# Patient Record
Sex: FEMALE | Race: WHITE | NOT HISPANIC OR LATINO | Employment: PART TIME | URBAN - METROPOLITAN AREA
[De-identification: names, ages, dates, MRNs, and addresses within clinical notes are randomized per-mention and may not be internally consistent; named-entity substitution may affect disease eponyms.]

---

## 2024-04-05 NOTE — PROGRESS NOTES
Assessment/Plan:    Encounter for annual routine gynecological examination  Pap collected today    Contraception discussed - OC to be started.     Encourage healthy diet, exercise, Calcium 1200mg per day and at least 800 iu Vitamin D daily.       Diagnoses and all orders for this visit:    Encounter for annual routine gynecological examination    Cervical cancer screening  -     Liquid-based pap, screening    Encounter for education about contraceptive use  -     levonorgestrel-ethinyl estradiol (AVIANE,ALESSE,LESSINA) 0.1-20 MG-MCG per tablet; Take 1 tablet by mouth daily          Subjective:      Patient ID: Luann Magana is a 23 y.o. female.    Patient presents for a routine annual visit  Menarche- 11Y/O  Last Pap Smear- collect today  LMP- 3/18/24 approx  Birth control-none    Non smoker  Social drinker  Has never been sexually active  Gardasil series-not on file. Per pt believes she did get.   No family history of uterine, ovarian, cervical or breast cancer    Would like to discuss starting birth control for cycle control.  Menses regular, lasting about a week.   Graduated for [employer].  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 Leaving in one month    Gynecologic Exam  She complains of genital itching (intermittent itching for years). She reports no genital lesions, genital odor, genital rash, pelvic pain, vaginal bleeding or vaginal discharge. This is a chronic problem. The current episode started more than 1 year ago. The problem occurs intermittently. The problem has been waxing and waning since onset. The patient is experiencing no pain. Pertinent negatives include no chills, constipation, diarrhea, fever, nausea, sore throat or vomiting. She is not sexually active. The patient's menstrual history has been regular.       The following portions of the patient's history were reviewed and updated as appropriate: She  has a past medical history of Anxiety, Eczema, Migraines, and Psoriasis.  She   Patient  "Active Problem List    Diagnosis Date Noted    Encounter for annual routine gynecological examination 04/08/2024     She  has no past surgical history on file.  Her family history is not on file.  She  reports that she has never smoked. She has never used smokeless tobacco. She reports current alcohol use. She reports that she does not use drugs.  Current Outpatient Medications   Medication Sig Dispense Refill    levonorgestrel-ethinyl estradiol (AVIANE,ALESSE,LESSINA) 0.1-20 MG-MCG per tablet Take 1 tablet by mouth daily 84 tablet 3     No current facility-administered medications for this visit.     No current outpatient medications on file prior to visit.     No current facility-administered medications on file prior to visit.     She has No Known Allergies..    Review of Systems   Constitutional:  Negative for activity change, appetite change, chills, fatigue and fever.   HENT:  Negative for rhinorrhea, sneezing and sore throat.    Eyes:  Negative for visual disturbance.   Respiratory:  Negative for cough, shortness of breath and wheezing.    Cardiovascular:  Negative for chest pain, palpitations and leg swelling.   Gastrointestinal:  Negative for abdominal distention, constipation, diarrhea, nausea and vomiting.   Genitourinary:  Negative for difficulty urinating, pelvic pain and vaginal discharge.   Neurological:  Negative for syncope and light-headedness.         Objective:      /70 (BP Location: Left arm, Patient Position: Sitting, Cuff Size: Standard)   Ht 5' 2.75\" (1.594 m)   Wt 73.6 kg (162 lb 3.2 oz)   LMP 03/18/2024 (Approximate)   BMI 28.96 kg/m²          Physical Exam  Chest:   Breasts:     Breasts are symmetrical.      Right: No inverted nipple, mass, nipple discharge, skin change or tenderness.      Left: No inverted nipple, mass, nipple discharge, skin change or tenderness.   Genitourinary:     Labia:         Right: No rash, tenderness, lesion or injury.         Left: No rash, tenderness, " lesion or injury.       Vagina: Normal. No vaginal discharge, erythema, tenderness or bleeding.      Cervix: No cervical motion tenderness, discharge, friability, erythema or cervical bleeding.      Uterus: Not deviated, not enlarged, not fixed and not tender.       Adnexa:         Right: No mass, tenderness or fullness.          Left: No mass, tenderness or fullness.        Comments: Hymen noted  No evidence of chronic inflmation, no discharge, no lesions  Neurological:      Mental Status: She is alert and oriented to person, place, and time.

## 2024-04-08 ENCOUNTER — OFFICE VISIT (OUTPATIENT)
Dept: OBGYN CLINIC | Facility: CLINIC | Age: 24
End: 2024-04-08
Payer: COMMERCIAL

## 2024-04-08 VITALS
WEIGHT: 162.2 LBS | DIASTOLIC BLOOD PRESSURE: 70 MMHG | SYSTOLIC BLOOD PRESSURE: 134 MMHG | BODY MASS INDEX: 28.74 KG/M2 | HEIGHT: 63 IN

## 2024-04-08 DIAGNOSIS — Z12.4 CERVICAL CANCER SCREENING: ICD-10-CM

## 2024-04-08 DIAGNOSIS — Z01.419 ENCOUNTER FOR ANNUAL ROUTINE GYNECOLOGICAL EXAMINATION: Primary | ICD-10-CM

## 2024-04-08 DIAGNOSIS — Z30.09 ENCOUNTER FOR EDUCATION ABOUT CONTRACEPTIVE USE: ICD-10-CM

## 2024-04-08 PROCEDURE — S0610 ANNUAL GYNECOLOGICAL EXAMINA: HCPCS | Performed by: OBSTETRICS & GYNECOLOGY

## 2024-04-08 PROCEDURE — G0145 SCR C/V CYTO,THINLAYER,RESCR: HCPCS | Performed by: OBSTETRICS & GYNECOLOGY

## 2024-04-08 RX ORDER — LEVONORGESTREL AND ETHINYL ESTRADIOL 0.1-0.02MG
1 KIT ORAL DAILY
Qty: 84 TABLET | Refills: 3 | Status: SHIPPED | OUTPATIENT
Start: 2024-04-08

## 2024-04-08 NOTE — ASSESSMENT & PLAN NOTE
Pap collected today    Contraception discussed - OC to be started.     Encourage healthy diet, exercise, Calcium 1200mg per day and at least 800 iu Vitamin D daily.

## 2024-04-13 LAB
LAB AP GYN PRIMARY INTERPRETATION: NORMAL
Lab: NORMAL

## 2024-05-08 PROBLEM — Z01.419 ENCOUNTER FOR ANNUAL ROUTINE GYNECOLOGICAL EXAMINATION: Status: RESOLVED | Noted: 2024-04-08 | Resolved: 2024-05-08

## 2025-02-24 ENCOUNTER — TELEPHONE (OUTPATIENT)
Age: 25
End: 2025-02-24

## 2025-02-24 NOTE — TELEPHONE ENCOUNTER
New Patient Self Referral    2nd opinion - Chronic Hives    Confirmed w/ARIANE Mcneil - must go to Derm/Allergy 1st.    Informed pt 1st avialable w/. Dr. Spence (pt requesting [facility]) was 7/22/25; pt declined, can no wait that long.